# Patient Record
Sex: FEMALE | Race: WHITE | HISPANIC OR LATINO | Employment: UNEMPLOYED | URBAN - METROPOLITAN AREA
[De-identification: names, ages, dates, MRNs, and addresses within clinical notes are randomized per-mention and may not be internally consistent; named-entity substitution may affect disease eponyms.]

---

## 2017-04-05 DIAGNOSIS — Z12.31 ENCOUNTER FOR SCREENING MAMMOGRAM FOR MALIGNANT NEOPLASM OF BREAST: ICD-10-CM

## 2017-04-06 ENCOUNTER — ALLSCRIPTS OFFICE VISIT (OUTPATIENT)
Dept: OTHER | Facility: OTHER | Age: 42
End: 2017-04-06

## 2018-01-14 VITALS
DIASTOLIC BLOOD PRESSURE: 72 MMHG | BODY MASS INDEX: 33.15 KG/M2 | SYSTOLIC BLOOD PRESSURE: 104 MMHG | WEIGHT: 180.13 LBS | HEIGHT: 62 IN

## 2018-03-21 ENCOUNTER — HOSPITAL ENCOUNTER (OUTPATIENT)
Facility: HOSPITAL | Age: 43
Setting detail: OBSERVATION
Discharge: HOME | End: 2018-03-22
Attending: EMERGENCY MEDICINE | Admitting: INTERNAL MEDICINE
Payer: COMMERCIAL

## 2018-03-21 ENCOUNTER — APPOINTMENT (EMERGENCY)
Dept: RADIOLOGY | Facility: HOSPITAL | Age: 43
End: 2018-03-21
Payer: COMMERCIAL

## 2018-03-21 DIAGNOSIS — E87.6 HYPOKALEMIA: ICD-10-CM

## 2018-03-21 DIAGNOSIS — R07.9 CHEST PAIN: Primary | ICD-10-CM

## 2018-03-21 PROBLEM — M06.9 RHEUMATOID ARTHRITIS (HCC): Chronic | Status: ACTIVE | Noted: 2018-03-21

## 2018-03-21 PROBLEM — Z86.79 HISTORY OF HYPERTENSION: Chronic | Status: ACTIVE | Noted: 2018-03-21

## 2018-03-21 PROBLEM — Z86.69 HISTORY OF MIGRAINE: Chronic | Status: ACTIVE | Noted: 2018-03-21

## 2018-03-21 PROBLEM — Z86.39 HISTORY OF THYROID DISEASE: Chronic | Status: ACTIVE | Noted: 2018-03-21

## 2018-03-21 PROBLEM — Z87.39 HISTORY OF RHEUMATOID ARTHRITIS: Chronic | Status: ACTIVE | Noted: 2018-03-21

## 2018-03-21 PROBLEM — Z87.09 HISTORY OF ASTHMA: Chronic | Status: ACTIVE | Noted: 2018-03-21

## 2018-03-21 LAB
ALBUMIN SERPL BCP-MCNC: 3.5 G/DL (ref 3.5–5)
ALP SERPL-CCNC: 89 U/L (ref 46–116)
ALT SERPL W P-5'-P-CCNC: 18 U/L (ref 12–78)
ANION GAP SERPL CALCULATED.3IONS-SCNC: 7 MMOL/L (ref 4–13)
AST SERPL W P-5'-P-CCNC: 27 U/L (ref 5–45)
ATRIAL RATE: 100 BPM
BASOPHILS # BLD AUTO: 0.03 THOUSANDS/ΜL (ref 0–0.1)
BASOPHILS NFR BLD AUTO: 0 % (ref 0–1)
BILIRUB SERPL-MCNC: 0.64 MG/DL (ref 0.2–1)
BUN SERPL-MCNC: 10 MG/DL (ref 5–25)
CALCIUM SERPL-MCNC: 8.6 MG/DL (ref 8.3–10.1)
CHLORIDE SERPL-SCNC: 104 MMOL/L (ref 100–108)
CO2 SERPL-SCNC: 28 MMOL/L (ref 21–32)
CREAT SERPL-MCNC: 0.79 MG/DL (ref 0.6–1.3)
EOSINOPHIL # BLD AUTO: 0.13 THOUSAND/ΜL (ref 0–0.61)
EOSINOPHIL NFR BLD AUTO: 2 % (ref 0–6)
ERYTHROCYTE [DISTWIDTH] IN BLOOD BY AUTOMATED COUNT: 13.9 % (ref 11.6–15.1)
GFR SERPL CREATININE-BSD FRML MDRD: 92 ML/MIN/1.73SQ M
GLUCOSE SERPL-MCNC: 78 MG/DL (ref 65–140)
HCT VFR BLD AUTO: 37.2 % (ref 34.8–46.1)
HGB BLD-MCNC: 12.8 G/DL (ref 11.5–15.4)
LYMPHOCYTES # BLD AUTO: 2.66 THOUSANDS/ΜL (ref 0.6–4.47)
LYMPHOCYTES NFR BLD AUTO: 31 % (ref 14–44)
MCH RBC QN AUTO: 29.1 PG (ref 26.8–34.3)
MCHC RBC AUTO-ENTMCNC: 34.4 G/DL (ref 31.4–37.4)
MCV RBC AUTO: 85 FL (ref 82–98)
MONOCYTES # BLD AUTO: 0.49 THOUSAND/ΜL (ref 0.17–1.22)
MONOCYTES NFR BLD AUTO: 6 % (ref 4–12)
NEUTROPHILS # BLD AUTO: 5.27 THOUSANDS/ΜL (ref 1.85–7.62)
NEUTS SEG NFR BLD AUTO: 61 % (ref 43–75)
NRBC BLD AUTO-RTO: 0 /100 WBCS
P AXIS: 62 DEGREES
PLATELET # BLD AUTO: 290 THOUSANDS/UL (ref 149–390)
PLATELET # BLD AUTO: 296 THOUSANDS/UL (ref 149–390)
PMV BLD AUTO: 9.5 FL (ref 8.9–12.7)
PMV BLD AUTO: 9.7 FL (ref 8.9–12.7)
POTASSIUM SERPL-SCNC: 3.4 MMOL/L (ref 3.5–5.3)
PR INTERVAL: 144 MS
PROT SERPL-MCNC: 7.6 G/DL (ref 6.4–8.2)
QRS AXIS: 36 DEGREES
QRSD INTERVAL: 78 MS
QT INTERVAL: 320 MS
QTC INTERVAL: 412 MS
RBC # BLD AUTO: 4.4 MILLION/UL (ref 3.81–5.12)
SODIUM SERPL-SCNC: 139 MMOL/L (ref 136–145)
T WAVE AXIS: 23 DEGREES
TROPONIN I SERPL-MCNC: <0.02 NG/ML
TSH SERPL DL<=0.05 MIU/L-ACNC: 0.66 UIU/ML (ref 0.36–3.74)
VENTRICULAR RATE: 100 BPM
WBC # BLD AUTO: 8.59 THOUSAND/UL (ref 4.31–10.16)

## 2018-03-21 PROCEDURE — 84484 ASSAY OF TROPONIN QUANT: CPT | Performed by: EMERGENCY MEDICINE

## 2018-03-21 PROCEDURE — 85025 COMPLETE CBC W/AUTO DIFF WBC: CPT | Performed by: EMERGENCY MEDICINE

## 2018-03-21 PROCEDURE — 84484 ASSAY OF TROPONIN QUANT: CPT | Performed by: INTERNAL MEDICINE

## 2018-03-21 PROCEDURE — 85049 AUTOMATED PLATELET COUNT: CPT | Performed by: INTERNAL MEDICINE

## 2018-03-21 PROCEDURE — 71046 X-RAY EXAM CHEST 2 VIEWS: CPT

## 2018-03-21 PROCEDURE — 36415 COLL VENOUS BLD VENIPUNCTURE: CPT | Performed by: EMERGENCY MEDICINE

## 2018-03-21 PROCEDURE — 93005 ELECTROCARDIOGRAM TRACING: CPT

## 2018-03-21 PROCEDURE — 80053 COMPREHEN METABOLIC PANEL: CPT | Performed by: EMERGENCY MEDICINE

## 2018-03-21 PROCEDURE — 93010 ELECTROCARDIOGRAM REPORT: CPT | Performed by: INTERNAL MEDICINE

## 2018-03-21 PROCEDURE — 99285 EMERGENCY DEPT VISIT HI MDM: CPT

## 2018-03-21 PROCEDURE — 99220 PR INITIAL OBSERVATION CARE/DAY 70 MINUTES: CPT | Performed by: INTERNAL MEDICINE

## 2018-03-21 PROCEDURE — 84443 ASSAY THYROID STIM HORMONE: CPT | Performed by: INTERNAL MEDICINE

## 2018-03-21 RX ORDER — NITROGLYCERIN 0.4 MG/1
0.4 TABLET SUBLINGUAL
Status: DISCONTINUED | OUTPATIENT
Start: 2018-03-21 | End: 2018-03-22 | Stop reason: HOSPADM

## 2018-03-21 RX ORDER — MAGNESIUM HYDROXIDE/ALUMINUM HYDROXICE/SIMETHICONE 120; 1200; 1200 MG/30ML; MG/30ML; MG/30ML
30 SUSPENSION ORAL EVERY 6 HOURS PRN
Status: DISCONTINUED | OUTPATIENT
Start: 2018-03-21 | End: 2018-03-22 | Stop reason: HOSPADM

## 2018-03-21 RX ORDER — BUDESONIDE AND FORMOTEROL FUMARATE DIHYDRATE 160; 4.5 UG/1; UG/1
2 AEROSOL RESPIRATORY (INHALATION) DAILY
Status: DISCONTINUED | OUTPATIENT
Start: 2018-03-22 | End: 2018-03-22 | Stop reason: HOSPADM

## 2018-03-21 RX ORDER — ACETAMINOPHEN 325 MG/1
650 TABLET ORAL EVERY 6 HOURS PRN
Status: DISCONTINUED | OUTPATIENT
Start: 2018-03-21 | End: 2018-03-22 | Stop reason: HOSPADM

## 2018-03-21 RX ORDER — FOLIC ACID 1 MG/1
1 TABLET ORAL DAILY
Status: DISCONTINUED | OUTPATIENT
Start: 2018-03-22 | End: 2018-03-22 | Stop reason: HOSPADM

## 2018-03-21 RX ORDER — MONTELUKAST SODIUM 10 MG/1
10 TABLET ORAL DAILY
Status: DISCONTINUED | OUTPATIENT
Start: 2018-03-22 | End: 2018-03-22 | Stop reason: HOSPADM

## 2018-03-21 RX ORDER — DICYCLOMINE HYDROCHLORIDE 10 MG/1
10 CAPSULE ORAL 4 TIMES DAILY
Status: DISCONTINUED | OUTPATIENT
Start: 2018-03-21 | End: 2018-03-22 | Stop reason: HOSPADM

## 2018-03-21 RX ORDER — FLUTICASONE PROPIONATE 50 MCG
2 SPRAY, SUSPENSION (ML) NASAL DAILY
Status: DISCONTINUED | OUTPATIENT
Start: 2018-03-22 | End: 2018-03-22 | Stop reason: HOSPADM

## 2018-03-21 RX ORDER — ASPIRIN 81 MG/1
162 TABLET, CHEWABLE ORAL DAILY
Status: DISCONTINUED | OUTPATIENT
Start: 2018-03-22 | End: 2018-03-22 | Stop reason: HOSPADM

## 2018-03-21 RX ORDER — MORPHINE SULFATE 2 MG/ML
1 INJECTION, SOLUTION INTRAMUSCULAR; INTRAVENOUS EVERY 4 HOURS PRN
Status: DISCONTINUED | OUTPATIENT
Start: 2018-03-21 | End: 2018-03-22 | Stop reason: HOSPADM

## 2018-03-21 RX ORDER — PANTOPRAZOLE SODIUM 40 MG/1
40 TABLET, DELAYED RELEASE ORAL
Status: DISCONTINUED | OUTPATIENT
Start: 2018-03-22 | End: 2018-03-22 | Stop reason: HOSPADM

## 2018-03-21 RX ORDER — BUDESONIDE AND FORMOTEROL FUMARATE DIHYDRATE 160; 4.5 UG/1; UG/1
2 AEROSOL RESPIRATORY (INHALATION) DAILY
COMMUNITY
Start: 2015-10-16 | End: 2021-04-22

## 2018-03-21 RX ORDER — FOLIC ACID 1 MG/1
1 TABLET ORAL DAILY
COMMUNITY

## 2018-03-21 RX ORDER — ALBUTEROL SULFATE 90 UG/1
2 AEROSOL, METERED RESPIRATORY (INHALATION) DAILY
COMMUNITY

## 2018-03-21 RX ORDER — HYDROXYCHLOROQUINE SULFATE 200 MG/1
200 TABLET, FILM COATED ORAL DAILY
Status: DISCONTINUED | OUTPATIENT
Start: 2018-03-22 | End: 2018-03-22 | Stop reason: HOSPADM

## 2018-03-21 RX ORDER — DICYCLOMINE HYDROCHLORIDE 10 MG/1
10 CAPSULE ORAL 4 TIMES DAILY
COMMUNITY
Start: 2017-04-28 | End: 2021-04-22

## 2018-03-21 RX ORDER — ELETRIPTAN HYDROBROMIDE 20 MG/1
20 TABLET, FILM COATED ORAL EVERY 2 HOUR PRN
COMMUNITY
Start: 2018-03-20 | End: 2021-04-22

## 2018-03-21 RX ORDER — FLUTICASONE PROPIONATE 50 MCG
2 SPRAY, SUSPENSION (ML) NASAL DAILY
COMMUNITY
Start: 2016-02-25 | End: 2021-04-22

## 2018-03-21 RX ORDER — FAMOTIDINE 20 MG/1
40 TABLET, FILM COATED ORAL DAILY
Status: DISCONTINUED | OUTPATIENT
Start: 2018-03-22 | End: 2018-03-22 | Stop reason: HOSPADM

## 2018-03-21 RX ORDER — HYDROXYCHLOROQUINE SULFATE 200 MG/1
200 TABLET, FILM COATED ORAL DAILY
COMMUNITY

## 2018-03-21 RX ORDER — POTASSIUM CHLORIDE 20 MEQ/1
40 TABLET, EXTENDED RELEASE ORAL ONCE
Status: COMPLETED | OUTPATIENT
Start: 2018-03-21 | End: 2018-03-21

## 2018-03-21 RX ORDER — ONDANSETRON 2 MG/ML
4 INJECTION INTRAMUSCULAR; INTRAVENOUS EVERY 6 HOURS PRN
Status: DISCONTINUED | OUTPATIENT
Start: 2018-03-21 | End: 2018-03-22 | Stop reason: HOSPADM

## 2018-03-21 RX ORDER — FAMOTIDINE 40 MG/1
40 TABLET, FILM COATED ORAL DAILY
COMMUNITY
Start: 2015-10-16

## 2018-03-21 RX ORDER — KETOROLAC TROMETHAMINE 30 MG/ML
15 INJECTION, SOLUTION INTRAMUSCULAR; INTRAVENOUS ONCE
Status: COMPLETED | OUTPATIENT
Start: 2018-03-21 | End: 2018-03-21

## 2018-03-21 RX ORDER — MONTELUKAST SODIUM 10 MG/1
10 TABLET ORAL DAILY
COMMUNITY
Start: 2015-12-21

## 2018-03-21 RX ORDER — PANTOPRAZOLE SODIUM 40 MG/1
40 TABLET, DELAYED RELEASE ORAL DAILY
COMMUNITY
Start: 2016-01-11 | End: 2021-04-22

## 2018-03-21 RX ORDER — ALBUTEROL SULFATE 90 UG/1
2 AEROSOL, METERED RESPIRATORY (INHALATION) DAILY
Status: DISCONTINUED | OUTPATIENT
Start: 2018-03-22 | End: 2018-03-22 | Stop reason: HOSPADM

## 2018-03-21 RX ORDER — FLUTICASONE FUROATE AND VILANTEROL 100; 25 UG/1; UG/1
1 POWDER RESPIRATORY (INHALATION) DAILY
COMMUNITY
Start: 2017-08-23 | End: 2018-08-23

## 2018-03-21 RX ORDER — PROPRANOLOL HYDROCHLORIDE 40 MG/1
40 TABLET ORAL DAILY
COMMUNITY
Start: 2016-01-11 | End: 2018-03-22 | Stop reason: HOSPADM

## 2018-03-21 RX ADMIN — KETOROLAC TROMETHAMINE 15 MG: 30 INJECTION, SOLUTION INTRAMUSCULAR at 16:02

## 2018-03-21 RX ADMIN — POTASSIUM CHLORIDE 40 MEQ: 1500 TABLET, EXTENDED RELEASE ORAL at 16:01

## 2018-03-21 RX ADMIN — DICYCLOMINE HYDROCHLORIDE 10 MG: 10 CAPSULE ORAL at 21:12

## 2018-03-21 RX ADMIN — DICYCLOMINE HYDROCHLORIDE 10 MG: 10 CAPSULE ORAL at 17:35

## 2018-03-21 NOTE — ASSESSMENT & PLAN NOTE
· No exacerbation at this point  · Continue home medications    · Outpatient follow-up with her rheumatologist

## 2018-03-21 NOTE — ASSESSMENT & PLAN NOTE
· Patient has atypical chest pain  This happened after an argument with her son  Pain lasted approximately 30 minutes  Patient's pain has now subsided  Possibly due to anxiety  Rule out acute coronary syndrome  · Continue with troponins  · Patient was given aspirin by EMS  Continue aspirin for now  · Nitroglycerin p r n     · Oxygen p r n  · Pain control p r n  · DEXTER score is 0   · Possible outpatient stress test   According to the notes that Darrell Alasecca, patient had a stress test in 2014 which was a stress echocardiogram which was negative  Patient also had an echocardiogram at that time cording to the notes with an ejection fraction of 60% and a mild MR and normal LA (the official reading at that time was that of a mild to moderate MR, however, according to the notes, the cardiologist who reviewed the echocardiogram, it is more of mild MR)  · According to the patient, she used to have a cardiologist at [facility]   Thus if workup is negative, may need to follow up with that cardiologist in the outpatient

## 2018-03-21 NOTE — ASSESSMENT & PLAN NOTE
· According to the patient, she previously had a history of hypertension and was previously on blood pressure medication  When asked, patient told me that she was on propanolol before  According to her, this was discontinued and she claims that the reason why he was that her blood pressures with down really low  However, from my review of patient's notes at [facility] at Care everywhere, the propanolol was discontinued due to her history of asthma  · There is some mild elevation patient's blood pressures here  But this may be anxiety driven as patient just had an argument with her son earlier today  If blood pressures are persistently and significantly high, we may need to prescribe this patient and maintenance blood pressure medication

## 2018-03-21 NOTE — ED ATTENDING ATTESTATION
Sonya Cerrato MD, saw and evaluated the patient  I have discussed the patient with the resident/non-physician practitioner and agree with the resident's/non-physician practitioner's findings, Plan of Care, and MDM as documented in the resident's/non-physician practitioner's note, except where noted  All available labs and Radiology studies were reviewed  At this point I agree with the current assessment done in the Emergency Department  I have conducted an independent evaluation of this patient a history and physical is as follows:      Critical Care Time  CritCare Time  OA: 38 y/o f with h/o HTN and mitral regurgitation who presents with an episode of CP that began just prior to arrival  Pt states that she had a verbal argument with her teenage son when she developed the pain  Pain is sharp, L sided, initially radiated to her L arm, now resolved  No new SOB  No other n/v/dizziness/abd or back pain  No focal numbness/weakness/tingling  Notes she has had similar pain previously but not for a few years  Also with tachycardia at baseline per her report  Had an ECHO in 2015 with normal EF and evidence of trace mitral and tricuspid regurgitation  She was told that she did not need any medicatino for this  Also not on any current medication for HTN x 2 years secondary to hypotensive episodesPE, well developed f in NAD, VSS, NC/AT, MMM, neck supple/fROM/-JVD, RR/-murmurs, lungs CTAB, -w/r, abd soft, NT/ND, +Bs, -r/g, - LE edema, - calf ttp, + 2 distal pulses and capillary refill < 2 sec, AAOx3  A/p CP, occurred in setting of arguement, improved at this time   Cardiac eval with labs including delta troponin, EKG, CXR, re-eval    Procedures

## 2018-03-21 NOTE — H&P
H&P- Tracie Carlson 1975, 37 y o  female MRN: [id number]    Unit/Bed#: ED 10 Encounter: [id number]    Primary Care Provider: LAYNE Lerma   Date and time admitted to hospital: 3/21/2018 12:46 PM        * Chest pain   Assessment & Plan    · Patient has atypical chest pain  This happened after an argument with her son  Pain lasted approximately 30 minutes  Patient's pain has now subsided  Possibly due to anxiety  Rule out acute coronary syndrome  · Continue with troponins  · Patient was given aspirin by EMS  Continue aspirin for now  · Nitroglycerin p r n     · Oxygen p r n  · Pain control p r n  · DEXTER score is 0   · Possible outpatient stress test   According to the notes that Darrell Levine, patient had a stress test in 2014 which was a stress echocardiogram which was negative  Patient also had an echocardiogram at that time cording to the notes with an ejection fraction of 60% and a mild MR and normal LA (the official reading at that time was that of a mild to moderate MR, however, according to the notes, the cardiologist who reviewed the echocardiogram, it is more of mild MR)  · According to the patient, she used to have a cardiologist at [facility]   Thus if workup is negative, may need to follow up with that cardiologist in the outpatient  Hypokalemia   Assessment & Plan    · Potassium was replaced in the emergency room  · Recheck electrolytes  History of thyroid disease   Assessment & Plan    · According to the patient, she had a history of thyroid swelling in the past, but eventually, she claims that this has been inactive  From my review of patient's records at Care everywhere, patient has history of hyperthyroidism in the past   She also told me that she has history of rapid heart rate even before (chronic tachycardia)    Thus we will check TSH as patient has a mildly elevated heart rate at this point at 102 per minute  I will sign this out to the night person today that if the TSH is abnormal, we should proceed with ordering for free T4  History of hypertension   Assessment & Plan    · According to the patient, she previously had a history of hypertension and was previously on blood pressure medication  When asked, patient told me that she was on propanolol before  According to her, this was discontinued and she claims that the reason why he was that her blood pressures with down really low  However, from my review of patient's notes at [facility] at [facility] everywhere, the propanolol was discontinued due to her history of asthma  · There is some mild elevation patient's blood pressures here  But this may be anxiety driven as patient just had an argument with her son earlier today  If blood pressures are persistently and significantly high, we may need to prescribe this patient and maintenance blood pressure medication  History of asthma   Assessment & Plan    · No exacerbation  History of rheumatoid arthritis   Assessment & Plan    · No exacerbation at this point  · Continue home medications  · Outpatient follow-up with her rheumatologist        History of migraine   Assessment & Plan    · But no episodes at this point  · Continue home medications  · Outpatient follow-up with own neurologist               VTE Prophylaxis: Enoxaparin (Lovenox)  / sequential compression device   Code Status:  Full code  POLST: POLST form is not discussed and not completed at this time  Discussion with family:  I spoke to patient's  at bedside  I discussed our findings and plans  I answered questions and concerns  Anticipated Length of Stay:  Patient will be admitted on an Observation basis with an anticipated length of stay of  less than 2 midnights  Justification for Hospital Stay:  Due to the above findings and plans      Total Time for Visit, including Counseling / Coordination of Care: 1 hour  Greater than 50% of this total time spent on direct patient counseling and coordination of care  Chief Complaint:   Chest pain    History of Present Illness:    Army Jimenez is a 37 y o  female who presents with chest pain to the emergency room at [facility]  Earlier today, patient admitted that she had an argument with her son  Due to this, patient had chest pains  According to her, the pain lasted approximately 30 minutes and was gone, by the time the EMS brought her here  She described the pain to be sharp in character and it was located on her left anterior upper chest area  This was associated with some numbness/tingling sensation on her left upper extremity  When asked about shortness of breath, patient could not tell me if she did have or did not have shortness of breath earlier today  Presently, patient denies any chest pains or any shortness of breath at this point  Patient denies any palpitations  Patient denies being clammy  Patient denies any lightheadedness or dizziness  Presently, patient told me she is doing fine now and does not have any complaints  According to the patient, EMS gave her aspirin while in route here  When asked, patient told me that she has history of hypertension in the past and was previously on blood pressure medication with propanolol but this was discontinued approximately 2 years ago due to low blood pressures  Patient also told me that her mother was diagnosed with a heart attack in her 45s  Patient also told me that 1 to 2 years ago, she had a stress test and she was told that it was normal except for a mild leakage in 1 of for valves and that her heart rate runs high  According to her, she had seen a cardiologist before at [facility]       Review of Systems:    Review of Systems     Ten point review of systems done and they were negative except for the ones I mentioned in my HPI    Patient denies any headaches, dizziness, loss of consciousness  Patient denies any fever, chills, cough  Patient denies any more shortness of breath or chest pains at this point  Patient denies any nausea or vomiting or any abdominal pains  Patient denies any urinary problems or bowel movement problems  Past Medical and Surgical History:     Past Medical History:   Diagnosis Date    Asthma     Hypertension     Migraine     Rheumatoid arthritis ([address] )        Past Surgical History:   Procedure Laterality Date    CARDIAC SURGERY         Meds/Allergies:    Prior to Admission medications    Medication Sig Start Date End Date Taking?  Authorizing Provider   budesonide-formoterol [facility]) 160-4 5 mcg/act inhaler Inhale 2 sprays daily 10/16/15  Yes Historical Provider, MD   dicyclomine (BENTYL) 10 mg capsule Take 10 mg by mouth 4 (four) times a day 4/28/17  Yes Historical Provider, MD   eletriptan (RELPAX) 20 MG tablet Take 20 mg by mouth every 2 (two) hours as needed 3/20/18 3/20/19 Yes Historical Provider, MD   etanercept (ENBREL SURECLICK) 50 MG/ML injection Inject 50 mg under the skin daily 3/21/16  Yes Historical Provider, MD   famotidine (PEPCID) 40 MG tablet Take 40 mg by mouth daily 10/16/15  Yes Historical Provider, MD   fluticasone (FLONASE) 50 mcg/act nasal spray 2 sprays into each nostril daily 2/25/16  Yes Historical Provider, MD   fluticasone furoate-vilanterol (BREO ELLIPTA) Inhale 1 puff daily 8/23/17 8/23/18 Yes Historical Provider, MD   folic acid (FOLVITE) 1 mg tablet Take 1 mg by mouth daily   Yes Historical Provider, MD   hydroxychloroquine (PLAQUENIL) 200 mg tablet Take 200 mg by mouth daily   Yes Historical Provider, MD   methotrexate 2 5 mg tablet Take 1 tablet by mouth daily   Yes Historical Provider, MD   montelukast (SINGULAIR) 10 mg tablet Take 10 mg by mouth daily 12/21/15  Yes Historical Provider, MD   pantoprazole (PROTONIX) 40 mg tablet Take 40 mg by mouth daily 1/11/16  Yes Historical Provider, MD   propranolol (INDERAL) 40 mg tablet Take 40 mg by mouth daily 1/11/16  Yes Historical Provider, MD   albuterol (VENTOLIN HFA) 90 mcg/act inhaler Inhale 2 puffs daily    Historical Provider, MD   etanercept (ENBREL) 50 mg/mL SOSY Inject under the skin daily      Historical Provider, MD     Medication list was reviewed  Allergies: No Known Allergies    Social History:     Marital Status:      History   Alcohol Use No     History   Smoking Status    Never Smoker   Smokeless Tobacco    Never Used     History   Drug Use No       Family History:    Significant for premature coronary artery disease/MI:  Mother in her 45s  Physical Exam:     Vitals:   Blood Pressure: 142/89 (03/21/18 1605)  Pulse: 104 (03/21/18 1605)  Temperature: 98 °F (36 7 °C) (03/21/18 1247)  Temp Source: Oral (03/21/18 1247)  Respirations: 18 (03/21/18 1605)  Height: 5' 2" (157 5 cm) (03/21/18 1247)  Weight - Scale: 78 5 kg (173 lb) (03/21/18 1247)  SpO2: 99 % (03/21/18 1605)    Physical Exam   Constitutional: She is oriented to person, place, and time  She appears well-developed and well-nourished  No distress  HENT:   Head: Normocephalic and atraumatic  Mouth/Throat: Oropharynx is clear and moist  No oropharyngeal exudate  Eyes: Conjunctivae and EOM are normal  Pupils are equal, round, and reactive to light  Right eye exhibits no discharge  Left eye exhibits no discharge  No scleral icterus  Neck: No JVD present  No tracheal deviation present  No thyromegaly present  Cardiovascular: Regular rhythm and normal heart sounds  Exam reveals no gallop and no friction rub  No murmur heard  Mildly tachycardic  Pulmonary/Chest: Effort normal and breath sounds normal  No stridor  No respiratory distress  She has no wheezes  She has no rales  She exhibits no tenderness  Abdominal: Soft  Bowel sounds are normal  She exhibits no distension  There is no tenderness  There is no rebound and no guarding     Musculoskeletal: She exhibits no edema, tenderness or deformity  Neurological: She is alert and oriented to person, place, and time  No cranial nerve deficit  Skin: Skin is warm  No rash noted  She is not diaphoretic  No erythema  No pallor  Psychiatric: Her behavior is normal  Thought content normal    Patient is anxious  Vitals reviewed  Additional Data:     Lab Results: I have personally reviewed pertinent reports  Results from last 7 days  Lab Units 03/21/18  1319   WBC Thousand/uL 8 59   HEMOGLOBIN g/dL 12 8   HEMATOCRIT % 37 2   PLATELETS Thousands/uL 290   NEUTROS PCT % 61   LYMPHS PCT % 31   MONOS PCT % 6   EOS PCT % 2       Results from last 7 days  Lab Units 03/21/18  1319   SODIUM mmol/L 139   POTASSIUM mmol/L 3 4*   CHLORIDE mmol/L 104   CO2 mmol/L 28   BUN mg/dL 10   CREATININE mg/dL 0 79   CALCIUM mg/dL 8 6   TOTAL PROTEIN g/dL 7 6   BILIRUBIN TOTAL mg/dL 0 64   ALK PHOS U/L 89   ALT U/L 18   AST U/L 27   GLUCOSE RANDOM mg/dL 78           Imaging: I have personally reviewed pertinent reports  XR chest 2 views   Final Result by LETTY Lemos MD (03/21 7649)      No acute cardiopulmonary disease  Workstation performed: [id number]             EKG, Pathology, and Other Studies Reviewed on Admission:   · EKG:  Normal sinus rhythm at 100 per minute  Allscripts / Epic Records Reviewed: Yes reviewed patient's records at Care everywhere  ** Please Note: This note has been constructed using a voice recognition system   **

## 2018-03-21 NOTE — ASSESSMENT & PLAN NOTE
· But no episodes at this point  · Continue home medications    · Outpatient follow-up with own neurologist

## 2018-03-21 NOTE — ED NOTES
Pt denies chest pain at this time  Pt transported to floor by EDT        Ace Blunt RN  03/21/18 1640

## 2018-03-21 NOTE — ASSESSMENT & PLAN NOTE
· According to the patient, she had a history of thyroid swelling in the past, but eventually, she claims that this has been inactive  From my review of patient's records at Care everywhere, patient has history of hyperthyroidism in the past   She also told me that she has history of rapid heart rate even before (chronic tachycardia)  Thus we will check TSH as patient has a mildly elevated heart rate at this point at 102 per minute  I will sign this out to the night person today that if the TSH is abnormal, we should proceed with ordering for free T4

## 2018-03-21 NOTE — ED CARE HANDOFF
Emergency Department Sign Out Note        Sign out and transfer of care from Dr Tanya Sneed  See Separate Emergency Department note  The patient, Tia Cedillo, was evaluated by the previous provider for Chest Pain  Workup Completed:  MSE, EKG (No acute ischemia, on my review)    ED Course / Workup Pending (followup):  Pending Labs, CXR  Reevaluation  HEART Risk Score    Flowsheet Row Most Recent Value   History  0 Filed at: 03/21/2018 1340   ECG  0 Filed at: 03/21/2018 1340   Age  1 Filed at: 03/21/2018 1340   Risk Factors  1 Filed at: 03/21/2018 1340   Troponin  0 Filed at: 03/21/2018 1340   Heart Score Risk Calculator   History  0 Filed at: 03/21/2018 1340   ECG  0 Filed at: 03/21/2018 1340   Age  1 Filed at: 03/21/2018 1340   Risk Factors  1 Filed at: 03/21/2018 1340   Troponin  0 Filed at: 03/21/2018 1340   HEART Score  2 Filed at: 03/21/2018 1340   HEART Score  2 Filed at: 03/21/2018 1340        Procedures  MDM  CritCare Time    Disposition  Final diagnoses:   Hypokalemia   Chest pain     Time reflects when diagnosis was documented in both MDM as applicable and the Disposition within this note     Time User Action Codes Description Comment    3/21/2018  2:13 PM Judy Anderson Add [E87 6] Hypokalemia     3/21/2018  3:33 PM Judy [place] Add [R07 9] Chest pain     3/21/2018  3:33 PM Judy [place] Modify [E87 6] Hypokalemia     3/21/2018  3:33 PM Judy [place] Modify [R07 9] Chest pain       ED Disposition     ED Disposition Condition Comment    Admit  Case was discussed with [facility] and the patient's admission status was agreed to be Admission Status: observation status to the service of Dr Ted Villegas  Follow-up Information    None       Patient's Medications    No medications on file     No discharge procedures on file         ED Provider  Electronically Signed by     Sneah Lomax MD  03/21/18 6905

## 2018-03-21 NOTE — ED PROVIDER NOTES
History  Chief Complaint   Patient presents with    Chest Pain     pt c o chest pain after an arguement with her son  This is a 70-year-old female that presents today with chest pain  Patient has history of asthma, hypertension, migraine, rheumatoid arthritis  She states she was having a fight with her son an argument when she started having chest pain which she describes as sharp on the left side of her chest that went down her arm  States she called the ambulance and received 324 of aspirin and feels slightly better  States she has history of a murmur which she has been worked up by Cardiology and had an echo in 2015  Denies any cough  No fevers or chills  No back pain  No weakness numbness or tingling  No history of MIs in the past   No history of PEs  No recent travel outside the country  No lower extremity edema  70-year-old female presenting with chest pain will do a cardiac workup  Prior to Admission Medications   Prescriptions Last Dose Informant Patient Reported? Taking?    albuterol (VENTOLIN HFA) 90 mcg/act inhaler   Yes No   Sig: Inhale 2 puffs daily   budesonide-formoterol (SYMBICORT) 160-4 5 mcg/act inhaler   Yes Yes   Sig: Inhale 2 sprays daily   dicyclomine (BENTYL) 10 mg capsule   Yes Yes   Sig: Take 10 mg by mouth 4 (four) times a day   eletriptan (RELPAX) 20 MG tablet   Yes Yes   Sig: Take 20 mg by mouth every 2 (two) hours as needed   etanercept (ENBREL SURECLICK) 50 MG/ML injection   Yes Yes   Sig: Inject 50 mg under the skin daily   etanercept (ENBREL) 50 mg/mL SOSY   Yes No   Sig: Inject under the skin daily     famotidine (PEPCID) 40 MG tablet   Yes Yes   Sig: Take 40 mg by mouth daily   fluticasone (FLONASE) 50 mcg/act nasal spray   Yes Yes   Si sprays into each nostril daily   fluticasone furoate-vilanterol (BREO ELLIPTA)   Yes Yes   Sig: Inhale 1 puff daily   folic acid (FOLVITE) 1 mg tablet   Yes Yes   Sig: Take 1 mg by mouth daily   hydroxychloroquine (PLAQUENIL) 200 mg tablet   Yes Yes   Sig: Take 200 mg by mouth daily   methotrexate 2 5 mg tablet   Yes Yes   Sig: Take 1 tablet by mouth daily   montelukast (SINGULAIR) 10 mg tablet   Yes Yes   Sig: Take 10 mg by mouth daily   pantoprazole (PROTONIX) 40 mg tablet   Yes Yes   Sig: Take 40 mg by mouth daily   propranolol (INDERAL) 40 mg tablet   Yes Yes   Sig: Take 40 mg by mouth daily      Facility-Administered Medications: None       Past Medical History:   Diagnosis Date    Asthma     Hypertension     Migraine     Rheumatoid arthritis ([facility] [address] )        Past Surgical History:   Procedure Laterality Date    CARDIAC SURGERY         History reviewed  No pertinent family history  I have reviewed and agree with the history as documented  Social History   Substance Use Topics    Smoking status: Never Smoker    Smokeless tobacco: Never Used    Alcohol use No        Review of Systems   Constitutional: Negative  HENT: Negative  Eyes: Negative  Respiratory: Negative for cough and shortness of breath  Cardiovascular: Positive for chest pain and palpitations  Gastrointestinal: Negative  Endocrine: Negative  Genitourinary: Negative  Musculoskeletal: Negative  Neurological: Negative  Hematological: Negative  Psychiatric/Behavioral: Negative  All other systems reviewed and are negative        Physical Exam  ED Triage Vitals   Temperature Pulse Respirations Blood Pressure SpO2   03/21/18 1247 03/21/18 1247 03/21/18 1247 03/21/18 1247 03/21/18 1247   98 °F (36 7 °C) 98 20 152/80 98 %      Temp Source Heart Rate Source Patient Position - Orthostatic VS BP Location FiO2 (%)   03/21/18 1247 03/21/18 1247 03/21/18 1401 03/21/18 1605 --   Oral Monitor Lying Right arm       Pain Score       03/21/18 1247       3           Orthostatic Vital Signs  Vitals:    03/21/18 2329 03/22/18 0311 03/22/18 0713 03/22/18 1109   BP: 103/67 126/76 120/74 132/87   Pulse: 84 84 82 92   Patient Position - Orthostatic VS: Lying Lying Lying        Physical Exam   Constitutional: She is oriented to person, place, and time  She appears well-developed and well-nourished  No distress  HENT:   Head: Normocephalic  Nose: Nose normal    Mouth/Throat: Oropharynx is clear and moist    Eyes: Conjunctivae and EOM are normal  Pupils are equal, round, and reactive to light  Neck: Normal range of motion  Neck supple  Cardiovascular: Normal rate, regular rhythm and normal heart sounds  No murmur heard  Pulmonary/Chest: Effort normal and breath sounds normal  No respiratory distress  She has no wheezes  Abdominal: Soft  Bowel sounds are normal  She exhibits no distension  There is no tenderness  Musculoskeletal: Normal range of motion  She exhibits no edema, tenderness or deformity  Neurological: She is alert and oriented to person, place, and time  Skin: Skin is warm  Capillary refill takes less than 2 seconds  No rash noted  She is not diaphoretic  Psychiatric: She has a normal mood and affect  Vitals reviewed  ED Medications  Medications   ketorolac (TORADOL) injection 15 mg (15 mg Intravenous Given 3/21/18 1602)   potassium chloride (K-DUR,KLOR-CON) CR tablet 40 mEq (40 mEq Oral Given 3/21/18 1601)       Diagnostic Studies  Results Reviewed     Procedure Component Value Units Date/Time    Troponin I [[id number]]  (Normal) Collected:  03/21/18 1319    Lab Status:  Final result Specimen:  Blood from Arm, Left Updated:  03/21/18 1420     Troponin I <0 02 ng/mL     Narrative:         [employer] Chemistry analyzer 99% cutoff is > 0 04 ng/mL in network labs    o cTnI 99% cutoff is useful only when applied to patients in the clinical setting of myocardial ischemia  o cTnI 99% cutoff should be interpreted in the context of clinical history, ECG findings and possibly cardiac imaging to establish correct diagnosis    o cTnI 99% cutoff may be suggestive but clearly not indicative of a coronary event without the clinical setting of myocardial ischemia  Comprehensive metabolic panel [[id number]]  (Abnormal) Collected:  03/21/18 1319    Lab Status:  Final result Specimen:  Blood from Arm, Left Updated:  03/21/18 1355     Sodium 139 mmol/L      Potassium 3 4 (L) mmol/L      Chloride 104 mmol/L      CO2 28 mmol/L      Anion Gap 7 mmol/L      BUN 10 mg/dL      Creatinine 0 79 mg/dL      Glucose 78 mg/dL      Calcium 8 6 mg/dL      AST 27 U/L      ALT 18 U/L      Alkaline Phosphatase 89 U/L      Total Protein 7 6 g/dL      Albumin 3 5 g/dL      Total Bilirubin 0 64 mg/dL      eGFR 92 ml/min/1 73sq m     Narrative:         National Kidney Disease Education Program recommendations are as follows:  GFR calculation is accurate only with a steady state creatinine  Chronic Kidney disease less than 60 ml/min/1 73 sq  meters  Kidney failure less than 15 ml/min/1 73 sq  meters  CBC and differential [[id number]]  (Normal) Collected:  03/21/18 1319    Lab Status:  Final result Specimen:  Blood from Arm, Left Updated:  03/21/18 1331     WBC 8 59 Thousand/uL      RBC 4 40 Million/uL      Hemoglobin 12 8 g/dL      Hematocrit 37 2 %      MCV 85 fL      MCH 29 1 pg      MCHC 34 4 g/dL      RDW 13 9 %      MPV 9 7 fL      Platelets 487 Thousands/uL      nRBC 0 /100 WBCs      Neutrophils Relative 61 %      Lymphocytes Relative 31 %      Monocytes Relative 6 %      Eosinophils Relative 2 %      Basophils Relative 0 %      Neutrophils Absolute 5 27 Thousands/µL      Lymphocytes Absolute 2 66 Thousands/µL      Monocytes Absolute 0 49 Thousand/µL      Eosinophils Absolute 0 13 Thousand/µL      Basophils Absolute 0 03 Thousands/µL                  XR chest 2 views   Final Result by LETTY Doran MD (03/21 3443)      No acute cardiopulmonary disease              Workstation performed: [id number]               Procedures  ECG 12 Lead Documentation  Date/Time: 3/21/2018 1:15 PM  Performed by: Araseli Pearson  Authorized by: Merlin Varela Indications / Diagnosis:  Chest pain  Patient location:  ED  Previous ECG:     Previous ECG:  Compared to current    Similarity:  No change  Interpretation:     Interpretation: normal    Rate:     ECG rate:  100    ECG rate assessment: normal    Rhythm:     Rhythm: sinus rhythm    Ectopy:     Ectopy: none    QRS:     QRS axis:  Normal  Conduction:     Conduction: normal    ST segments:     ST segments:  Normal  T waves:     T waves: normal            Phone Consults  ED Phone Contact    ED Course  ED Course          HEART Risk Score    Flowsheet Row Most Recent Value   History  0 Filed at: 03/21/2018 1340   ECG  0 Filed at: 03/21/2018 1340   Age  1 Filed at: 03/21/2018 1340   Risk Factors  1 Filed at: 03/21/2018 1340   Troponin  0 Filed at: 03/21/2018 1340   Heart Score Risk Calculator   History  0 Filed at: 03/21/2018 1340   ECG  0 Filed at: 03/21/2018 1340   Age  1 Filed at: 03/21/2018 1340   Risk Factors  1 Filed at: 03/21/2018 1340   Troponin  0 Filed at: 03/21/2018 1340   HEART Score  2 Filed at: 03/21/2018 1340   HEART Score  2 Filed at: 03/21/2018 1340                    DEXTER Risk Score    Flowsheet Row Most Recent Value   Age >= 65  0 Filed at: 03/22/2018 0930   Known CAD (stenosis >= 50%)  0 Filed at: 03/22/2018 0930   Recent (<=24 hrs) Service Angina  1 Filed at: 03/22/2018 0930   ST Deviation >= 0 5 mm  0 Filed at: 03/22/2018 0930   3+ CAD Risk Factors (FHx, HTN, HLP, DM, Smoker)  0 Filed at: 03/22/2018 0930   Aspirin Use Past 7 Days  0 Filed at: 03/22/2018 0930   Elevated Cardiac Markers  0 Filed at: 03/22/2018 0930   DEXTER Risk Score (Calculated)  1 Filed at: 03/22/2018 0930              MDM  CritCare Time    Disposition  Final diagnoses:   Hypokalemia   Chest pain     Time reflects when diagnosis was documented in both MDM as applicable and the Disposition within this note     Time User Action Codes Description Comment    3/21/2018  2:13 PM Jackie Johnston Add [E87 6] Hypokalemia     3/21/2018 3:33 PM Deric Swenson Add [R07 9] Chest pain     3/21/2018  3:33 PM Deric Swenson Modify [E87 6] Hypokalemia     3/21/2018  3:33 PM Deric Swenson Modify [R07 9] Chest pain       ED Disposition     ED Disposition Condition Comment    Admit  Case was discussed with [facility] and the patient's admission status was agreed to be Admission Status: observation status to the service of Dr Briseyda Ulloa          Follow-up Information    None       Discharge Medication List as of 3/22/2018 11:46 AM      CONTINUE these medications which have NOT CHANGED    Details   budesonide-formoterol (SYMBICORT) 160-4 5 mcg/act inhaler Inhale 2 sprays daily, Starting Fri 10/16/2015, Historical Med      dicyclomine (BENTYL) 10 mg capsule Take 10 mg by mouth 4 (four) times a day, Starting Fri 4/28/2017, Historical Med      eletriptan (RELPAX) 20 MG tablet Take 20 mg by mouth every 2 (two) hours as needed, Starting Tue 3/20/2018, Until Wed 3/20/2019, Historical Med      !! etanercept (ENBREL SURECLICK) 50 MG/ML injection Inject 50 mg under the skin daily, Starting Mon 3/21/2016, Historical Med      famotidine (PEPCID) 40 MG tablet Take 40 mg by mouth daily, Starting Fri 10/16/2015, Historical Med      fluticasone (FLONASE) 50 mcg/act nasal spray 2 sprays into each nostril daily, Starting Thu 2/25/2016, Historical Med      fluticasone furoate-vilanterol (BREO ELLIPTA) Inhale 1 puff daily, Starting Wed 8/23/2017, Until Thu 8/23/2018, Historical Med      folic acid (FOLVITE) 1 mg tablet Take 1 mg by mouth daily, Historical Med      hydroxychloroquine (PLAQUENIL) 200 mg tablet Take 200 mg by mouth daily, Historical Med      methotrexate 2 5 mg tablet Take 1 tablet by mouth daily, Historical Med      montelukast (SINGULAIR) 10 mg tablet Take 10 mg by mouth daily, Starting Mon 12/21/2015, Historical Med      pantoprazole (PROTONIX) 40 mg tablet Take 40 mg by mouth daily, Starting Mon 1/11/2016, Historical Med      albuterol (VENTOLIN HFA) 90 mcg/act inhaler Inhale 2 puffs daily, Historical Med      !! etanercept (ENBREL) 50 mg/mL SOSY Inject under the skin daily  , Historical Med       !! - Potential duplicate medications found  Please discuss with provider  STOP taking these medications       propranolol (INDERAL) 40 mg tablet Comments:   Reason for Stopping:               Outpatient Discharge Orders  Discharge Diet     Activity as tolerated         ED Provider  Attending physically available and evaluated [facility]  I managed the patient along with the ED Attending      Electronically Signed by         Simon Chacon MD  03/22/18 1806

## 2018-03-21 NOTE — PLAN OF CARE
Problem: Potential for Falls  Goal: Patient will remain free of falls  INTERVENTIONS:  - Assess patient frequently for physical needs  -  Identify cognitive and physical deficits and behaviors that affect risk of falls    -  [place] fall precautions as indicated by assessment   - Educate patient/family on patient safety including physical limitations  - Instruct patient to call for assistance with activity based on assessment  - Modify environment to reduce risk of injury  - Consider OT/PT consult to assist with strengthening/mobility   Outcome: Progressing

## 2018-03-22 VITALS
WEIGHT: 173 LBS | OXYGEN SATURATION: 99 % | SYSTOLIC BLOOD PRESSURE: 132 MMHG | RESPIRATION RATE: 18 BRPM | TEMPERATURE: 99 F | BODY MASS INDEX: 31.83 KG/M2 | HEART RATE: 92 BPM | HEIGHT: 62 IN | DIASTOLIC BLOOD PRESSURE: 87 MMHG

## 2018-03-22 PROBLEM — R07.9 CHEST PAIN: Status: RESOLVED | Noted: 2018-03-21 | Resolved: 2018-03-22

## 2018-03-22 PROBLEM — Z86.69 HISTORY OF MIGRAINE: Chronic | Status: RESOLVED | Noted: 2018-03-21 | Resolved: 2018-03-22

## 2018-03-22 PROBLEM — E87.6 HYPOKALEMIA: Status: RESOLVED | Noted: 2018-03-21 | Resolved: 2018-03-22

## 2018-03-22 LAB
ANION GAP SERPL CALCULATED.3IONS-SCNC: 8 MMOL/L (ref 4–13)
BUN SERPL-MCNC: 12 MG/DL (ref 5–25)
CALCIUM SERPL-MCNC: 8.4 MG/DL (ref 8.3–10.1)
CHLORIDE SERPL-SCNC: 108 MMOL/L (ref 100–108)
CO2 SERPL-SCNC: 26 MMOL/L (ref 21–32)
CREAT SERPL-MCNC: 0.7 MG/DL (ref 0.6–1.3)
GFR SERPL CREATININE-BSD FRML MDRD: 106 ML/MIN/1.73SQ M
GLUCOSE SERPL-MCNC: 69 MG/DL (ref 65–140)
MAGNESIUM SERPL-MCNC: 1.9 MG/DL (ref 1.6–2.6)
POTASSIUM SERPL-SCNC: 3.7 MMOL/L (ref 3.5–5.3)
SODIUM SERPL-SCNC: 142 MMOL/L (ref 136–145)

## 2018-03-22 PROCEDURE — 83735 ASSAY OF MAGNESIUM: CPT | Performed by: INTERNAL MEDICINE

## 2018-03-22 PROCEDURE — 80048 BASIC METABOLIC PNL TOTAL CA: CPT | Performed by: INTERNAL MEDICINE

## 2018-03-22 PROCEDURE — 99217 PR OBSERVATION CARE DISCHARGE MANAGEMENT: CPT | Performed by: PHYSICIAN ASSISTANT

## 2018-03-22 RX ADMIN — HYDROXYCHLOROQUINE SULFATE 200 MG: 200 TABLET, FILM COATED ORAL at 08:43

## 2018-03-22 RX ADMIN — ASPIRIN 81 MG 162 MG: 81 TABLET ORAL at 08:46

## 2018-03-22 RX ADMIN — ACETAMINOPHEN 650 MG: 325 TABLET, FILM COATED ORAL at 03:13

## 2018-03-22 RX ADMIN — DICYCLOMINE HYDROCHLORIDE 10 MG: 10 CAPSULE ORAL at 08:46

## 2018-03-22 RX ADMIN — FOLIC ACID 1 MG: 1 TABLET ORAL at 08:46

## 2018-03-22 RX ADMIN — DICYCLOMINE HYDROCHLORIDE 10 MG: 10 CAPSULE ORAL at 11:36

## 2018-03-22 RX ADMIN — FAMOTIDINE 40 MG: 20 TABLET, FILM COATED ORAL at 08:46

## 2018-03-22 RX ADMIN — MONTELUKAST SODIUM 10 MG: 10 TABLET, COATED ORAL at 08:43

## 2018-03-22 RX ADMIN — PANTOPRAZOLE SODIUM 40 MG: 40 TABLET, DELAYED RELEASE ORAL at 06:15

## 2018-03-22 RX ADMIN — ACETAMINOPHEN 650 MG: 325 TABLET, FILM COATED ORAL at 10:40

## 2018-03-22 NOTE — ASSESSMENT & PLAN NOTE
· Patient has atypical chest pain  This happened after an argument with her son  Pain lasted approximately 30 minutes  Patient's pain has now subsided  Possibly due to anxiety vs musculoskeletal   · Troponins negative x3  · DEXTER score: 0   · No need for out-patient stress test unless PCP recommends this  Asked patient to follow up with her primary care doctor  According to the notes that Darrell Alasecca, patient had a stress test in 2014 which was a stress echocardiogram which was negative  Patient also had an echocardiogram at that time cording to the notes with an ejection fraction of 60% and a mild MR and normal LA (the official reading at that time was that of a mild to moderate MR, however, according to the notes, the cardiologist who reviewed the echocardiogram, it is more of mild MR)

## 2018-03-22 NOTE — ASSESSMENT & PLAN NOTE
· According to the patient, she had a history of thyroid swelling in the past, but eventually, she claims that this has been inactive  From my review of patient's records at Care everywhere, patient has history of hyperthyroidism in the past   She also told me that she has history of rapid heart rate even before (chronic tachycardia)      · TSH is normal

## 2018-03-22 NOTE — SOCIAL WORK
CM met with patient and , explained CM role with introduction,  Patient lives with  and 2 tennage children in [address] [facility] with 2STE from front and 3STE from back of house, 14 steps to second floor  Patient independent PTA, including driving and working  Patient has no prior DME,HHC or inpatient rehab experience  PCP is Ambreen TILLMAN  Patient has prescription plan and uses [facility]  [place] in [place]  Patient has no POA, denies MH or drug and alcohol treatment  Primary contact is patient's  Toddmadelin Haley [phone]  CM reviewed d/c planning process including the following: identifying help at home, patient preference for d/c planning needs, Discharge Lounge, [facility] Meds to Bed program, availability of treatment team to discuss questions or concerns patient and/or family may have regarding understanding medications and recognizing signs and symptoms once discharged  CM also encouraged patient to follow up with all recommended appointments after discharge  Patient advised of importance for patient and family to participate in managing patients medical well being  Patient for discharge today, no discharge needs at this time

## 2018-03-22 NOTE — ASSESSMENT & PLAN NOTE
· According to the patient, she previously had a history of hypertension and was previously on blood pressure medication  When asked, patient told me that she was on propanolol before  According to her, this was discontinued and she claims that the reason why he was that her blood pressures with down really low  However, from my review of patient's notes at [facility] at Care everywhere, the propanolol was discontinued due to her history of asthma  · There is some mild elevation patient's blood pressures here  But this may be anxiety driven as patient just had an argument with her son earlier today  If blood pressures are persistently and significantly high, we may need to prescribe this patient and maintenance blood pressure medication

## 2018-03-22 NOTE — CASE MANAGEMENT
Thank you,  [address] [facility] in the [facility] by Lucas Aggarwal for 2017  Network Utilization Review Department  Phone: [phone]; Fax [phone]  ATTENTION: The Network Utilization Review Department is now centralized for our 7 Facilities  Make a note that we have a new phone and fax numbers for our Department  Please call with any questions or concerns to [phone] and carefully follow the prompts so that you are directed to the right person  All voicemails are confidential  Fax any determinations, approvals, denials, and requests for initial or continue stay review clinical to [phone]  Due to HIGH CALL volume, it would be easier if you could please send faxed requests to expedite your requests and in part, help us provide discharge notifications faster     ==========================================================================    Initial Clinical Review    Admission: Date/Time/Statement:  03/21/2018 @ 1531  Orders Placed This Encounter   Procedures    Place in Observation (expected length of stay for this patient is less than two midnights)     Standing Status:   Standing     Number of Occurrences:   1     Order Specific Question:   Admitting Physician     Answer:   Sunday Fetter     Order Specific Question:   Level of Care     Answer:   Med Surg [16]         ED: Date/Time/Mode of Arrival:   ED Arrival Information     Expected Arrival Acuity Means of Arrival Escorted By Service Admission Type    - 3/21/2018 12:45 Urgent [address] [facility] General Medicine Urgent    Arrival Complaint    chest pain          Chief Complaint:   Chief Complaint   Patient presents with    Chest Pain     pt c o chest pain after an arguement with her son  History of Illness:   Anil Tejeda is a 37 y o  female who presents with chest pain to the emergency room at [facility]    Earlier today, patient admitted that she had an argument with her son  Due to this, patient had chest pains  According to her, the pain lasted approximately 30 minutes and was gone, by the time the EMS brought her here  She described the pain to be sharp in character and it was located on her left anterior upper chest area  This was associated with some numbness/tingling sensation on her left upper extremity  When asked about shortness of breath, patient could not tell me if she did have or did not have shortness of breath earlier today  ED Vital Signs:   ED Triage Vitals   Temperature Pulse Respirations Blood Pressure SpO2   18 1247 18 1247 18 1247 18 1247 18 1247   98 °F (36 7 °C) 98 20 152/80 98 %      Temp Source Heart Rate Source Patient Position - Orthostatic VS BP Location FiO2 (%)   18 1247 18 1247 18 1401 18 1605 --   Oral Monitor Lying Right arm       Pain Score       18 1247       3        Wt Readings from Last 1 Encounters:   18 78 5 kg (173 lb)       Abnormal Labs/Diagnostic Test Results:   WBC Thousand/uL 8 59   HEMOGLOBIN g/dL 12 8   HEMATOCRIT % 37 2   PLATELETS Thousands/uL 290     SODIUM mmol/L 139   POTASSIUM mmol/L 3 4*   CHLORIDE mmol/L 104   CO2 mmol/L 28   BUN mg/dL 10   CREATININE mg/dL 0 79   CALCIUM mg/dL 8 6   TOTAL PROTEIN g/dL 7 6   BILIRUBIN TOTAL mg/dL 0 64   ALK PHOS U/L 89   ALT U/L 18   AST U/L 27   GLUCOSE RANDOM mg/dL 78     Troponin I <=0 04 ng/mL <0 02      Chest X: No acute cardiopulmonary disease  EC, NSR    ED Treatment:   Medication Administration from 2018 1245 to 2018 1649       Date/Time Order Dose Route Action Action by Comments     2018 1602 ketorolac (TORADOL) injection 15 mg 15 mg Intravenous Given [employer], RN      2018 1601 potassium chloride (K-DUR,KLOR-CON) CR tablet 40 mEq 40 mEq Oral Given [employer], RN           Past Medical/Surgical History:    Active Ambulatory Problems Diagnosis Date Noted    [facility] 03/21/2018     Past Medical History:   Diagnosis Date    Asthma     Hypertension     Migraine     [facility]        Admitting Diagnosis: Hypokalemia [E87 6]  Chest pain [R07 9]    Age/Sex: 37 y o  female    Assessment/Plan:   * Chest pain   Assessment & Plan     · Patient has atypical chest pain  This happened after an argument with her son  Pain lasted approximately 30 minutes  Patient's pain has now subsided  Possibly due to anxiety  Rule out acute coronary syndrome  · Continue with troponins  · Patient was given aspirin by EMS  Continue aspirin for now  · Nitroglycerin p r n     · Oxygen p r n  · Pain control p r n  · DEXTER score is 0   · Possible outpatient stress test   According to the notes that Darrell Levine, patient had a stress test in 2014 which was a stress echocardiogram which was negative  Patient also had an echocardiogram at that time cording to the notes with an ejection fraction of 60% and a mild MR and normal LA (the official reading at that time was that of a mild to moderate MR, however, according to the notes, the cardiologist who reviewed the echocardiogram, it is more of mild MR)  · According to the patient, she used to have a cardiologist at [facility]   Thus if workup is negative, may need to follow up with that cardiologist in the outpatient             Hypokalemia   Assessment & Plan     · Potassium was replaced in the emergency room  · Recheck electrolytes           History of thyroid disease   Assessment & Plan     · According to the patient, she had a history of thyroid swelling in the past, but eventually, she claims that this has been inactive  From my review of patient's records at [facility], patient has history of hyperthyroidism in the past   She also told me that she has history of rapid heart rate even before (chronic tachycardia)    Thus we will check TSH as patient has a mildly elevated heart rate at this point at 102 per minute  I will sign this out to the night person today that if the TSH is abnormal, we should proceed with ordering for free T4           History of hypertension   Assessment & Plan     · According to the patient, she previously had a history of hypertension and was previously on blood pressure medication  When asked, patient told me that she was on propanolol before  According to her, this was discontinued and she claims that the reason why he was that her blood pressures with down really low  However, from my review of patient's notes at [facility] at [facility], the propanolol was discontinued due to her history of asthma  · There is some mild elevation patient's blood pressures here  But this may be anxiety driven as patient just had an argument with her son earlier today  If blood pressures are persistently and significantly high, we may need to prescribe this patient and maintenance blood pressure medication           History of asthma   Assessment & Plan     · No exacerbation              History of rheumatoid arthritis   Assessment & Plan     · No exacerbation at this point  · Continue home medications  · Outpatient follow-up with her rheumatologist          History of migraine   Assessment & Plan     · But no episodes at this point  · Continue home medications  · Outpatient follow-up with own neurologist               VTE Prophylaxis: Enoxaparin (Lovenox)  / sequential compression device      Anticipated Length of Stay:  Patient will be admitted on an Observation basis with an anticipated length of stay of  less than 2 midnights     Justification for Hospital Stay:  Due to the above findings and plans        Admission Orders:  Scheduled Meds:   Current Facility-Administered Medications:  acetaminophen 650 mg Oral Q6H PRN   albuterol 2 puff Inhalation Daily   aluminum-magnesium hydroxide-simethicone 30 mL Oral Q6H PRN   aspirin 162 mg Oral Daily   budesonide-formoterol 2 puff Inhalation Daily   dicyclomine 10 mg Oral 4x Daily   enoxaparin 40 mg Subcutaneous Daily   etanercept 50 mg Subcutaneous Q7 Days   famotidine 40 mg Oral Daily   fluticasone 2 spray Nasal Daily   folic acid 1 mg Oral Daily   hydroxychloroquine 200 mg Oral Daily   [START ON 3/23/2018] methotrexate 2 5 mg Oral Weekly   montelukast 10 mg Oral Daily   morphine injection 1 mg Intravenous Q4H PRN   nitroglycerin 0 4 mg Sublingual Q5 Min PRN   ondansetron 4 mg Intravenous Q6H PRN   pantoprazole 40 mg Oral Early Morning     TELM  O2 @ 2L via NC  Shree SCDs

## 2018-03-22 NOTE — DISCHARGE SUMMARY
Discharge- Coy aMthur 1975, 37 y o  female MRN: [id number]  Unit/Bed#: CW2 210-01 Encounter: [id number]  Primary Care Provider: LAYNE Etienne   Date and time admitted to hospital: 3/21/2018 12:46 PM    * Chest painresolved as of 3/22/2018   Assessment & Plan    · Patient has atypical chest pain  This happened after an argument with her son  Pain lasted approximately 30 minutes  Patient's pain has now subsided  Possibly due to anxiety vs musculoskeletal   · Troponins negative x3  · DEXTER score: 0   · No need for out-patient stress test unless PCP recommends this  Asked patient to follow up with her primary care doctor  According to the notes that Darrell Levine, patient had a stress test in 2014 which was a stress echocardiogram which was negative  Patient also had an echocardiogram at that time cording to the notes with an ejection fraction of 60% and a mild MR and normal LA (the official reading at that time was that of a mild to moderate MR, however, according to the notes, the cardiologist who reviewed the echocardiogram, it is more of mild MR)  History of thyroid disease   Assessment & Plan    · According to the patient, she had a history of thyroid swelling in the past, but eventually, she claims that this has been inactive  From my review of patient's records at [facility], patient has history of hyperthyroidism in the past   She also told me that she has history of rapid heart rate even before (chronic tachycardia)  · TSH is normal         History of hypertension   Assessment & Plan    · According to the patient, she previously had a history of hypertension and was previously on blood pressure medication  When asked, patient told me that she was on propanolol before  According to her, this was discontinued and she claims that the reason why he was that her blood pressures with down really low    However, from my review of patient's notes at [facility] at [facility], the propanolol was discontinued due to her history of asthma  · There is some mild elevation patient's blood pressures here  But this may be anxiety driven as patient just had an argument with her son earlier today  If blood pressures are persistently and significantly high, we may need to prescribe this patient and maintenance blood pressure medication  History of asthma   Assessment & Plan    · No exacerbation  History of rheumatoid arthritis   Assessment & Plan    · No exacerbation at this point  · Continue home medications  · Outpatient follow-up with her rheumatologist        History of migraine   Assessment & Plan    · But no episodes at this point  · Continue home medications  · Outpatient follow-up with own neurologist             Resolved Problems  Date Reviewed: 3/22/2018          Resolved    * (Principal)Chest pain 3/22/2018     Resolved by  Willy Angulo PA-C    Hypokalemia 3/22/2018     Resolved by  Willy Angulo PA-C        Consultations During Hospital Stay:  · None    Procedures Performed:   · Chest x-ray:  No acute cardiopulmonary disease  Significant Findings / Test Results:   · None    Incidental Findings:   · None     Test Results Pending at Discharge (will require follow up): · None     Outpatient Tests Requested:  · None    Complications:  None    Reason for Admission:  Chest pain    Hospital Course:     Jacky Hernandez is a 37 y o  female patient who originally presented to the hospital on 3/21/2018 due to chest pain which began after engaging in an argument with her son  Her symptoms resolved spontaneously within 30 minutes  She was monitored on telemetry with no evidence of arrhythmia  She had a normal EKG  Troponins were negative x3  Her symptoms resolved without recurrence during her hospital stay    Patient had a stress test done a few years ago which was normal   At this time, I strongly suspect this is musculoskeletal in nature and in fact will not schedule her for an outpatient stress test   However, she should follow up with her primary care doctor within 1 week to determine if an outpatient stress test is warranted  Please see above list of diagnoses and related plan for additional information  Condition at Discharge: stable     Discharge Day Visit / Exam:     Subjective:  Patient states her chestPain has resolved without recurrence  She denies any nausea, vomiting, shortness of breath  Vitals: Blood Pressure: 120/74 (03/22/18 0713)  Pulse: 82 (03/22/18 0713)  Temperature: 97 8 °F (36 6 °C) (03/22/18 0713)  Temp Source: Oral (03/22/18 0713)  Respirations: 18 (03/22/18 0713)  Height: 5' 2" (157 5 cm) (03/21/18 1700)  Weight - Scale: 78 5 kg (173 lb) (03/21/18 1700)  SpO2: 96 % (03/22/18 0713)  Exam:   Physical Exam   HENT:   Head: Normocephalic and atraumatic  Mouth/Throat: Oropharynx is clear and moist and mucous membranes are normal    Eyes: No scleral icterus  Cardiovascular: Normal rate and regular rhythm  No murmur heard  Pulmonary/Chest: Breath sounds normal  She has no wheezes  She has no rales  She exhibits no tenderness  Abdominal: Soft  Bowel sounds are normal  She exhibits no distension  There is no tenderness  Musculoskeletal: Normal range of motion  She exhibits no edema  Skin: Skin is warm and dry  No rash noted  Psychiatric: She has a normal mood and affect  Vitals reviewed  Discussion with Family:  Spouse is at the bedside  Discharge instructions/Information to patient and family:   See after visit summary for information provided to patient and family  Provisions for Follow-Up Care:  See after visit summary for information related to follow-up care and any pertinent home health orders        Disposition:     Home    For Discharges to [facility]:   · Not Applicable to this Patient - Not Applicable to this Patient    Planned Readmission: No     Discharge Statement:  I spent 45 minutes discharging the patient  This time was spent on the day of discharge  I had direct contact with the patient on the day of discharge  Greater than 50% of the total time was spent examining patient, answering all patient questions, arranging and discussing plan of care with patient as well as directly providing post-discharge instructions  Additional time then spent on discharge activities  Discharge Medications:  See after visit summary for reconciled discharge medications provided to patient and family        ** Please Note: This note has been constructed using a voice recognition system **

## 2018-06-05 ENCOUNTER — ANNUAL EXAM (OUTPATIENT)
Dept: GYNECOLOGY | Facility: CLINIC | Age: 43
End: 2018-06-05
Payer: COMMERCIAL

## 2018-06-05 VITALS
DIASTOLIC BLOOD PRESSURE: 78 MMHG | BODY MASS INDEX: 32.17 KG/M2 | SYSTOLIC BLOOD PRESSURE: 120 MMHG | HEIGHT: 62 IN | WEIGHT: 174.8 LBS

## 2018-06-05 DIAGNOSIS — Z01.419 ENCOUNTER FOR GYNECOLOGICAL EXAMINATION WITHOUT ABNORMAL FINDING: ICD-10-CM

## 2018-06-05 DIAGNOSIS — Z12.31 ENCOUNTER FOR SCREENING MAMMOGRAM FOR MALIGNANT NEOPLASM OF BREAST: Primary | ICD-10-CM

## 2018-06-05 PROCEDURE — 99396 PREV VISIT EST AGE 40-64: CPT | Performed by: OBSTETRICS & GYNECOLOGY

## 2018-06-05 RX ORDER — LORATADINE 10 MG/1
TABLET ORAL
COMMUNITY
Start: 2018-05-31

## 2018-06-05 RX ORDER — FAMOTIDINE 40 MG/1
TABLET, FILM COATED ORAL
COMMUNITY
Start: 2018-05-29 | End: 2021-04-22

## 2018-06-05 RX ORDER — TRAZODONE HYDROCHLORIDE 50 MG/1
50 TABLET ORAL DAILY
COMMUNITY
End: 2021-04-22

## 2018-06-05 RX ORDER — MECLIZINE HYDROCHLORIDE 25 MG/1
25 TABLET ORAL 3 TIMES DAILY
COMMUNITY
Start: 2017-01-03 | End: 2021-04-22

## 2018-06-05 RX ORDER — INDOMETHACIN 50 MG/1
CAPSULE ORAL
COMMUNITY
Start: 2018-04-20 | End: 2021-04-22

## 2018-06-05 RX ORDER — CEPHALEXIN 500 MG/1
CAPSULE ORAL
COMMUNITY
Start: 2018-03-29 | End: 2021-04-22

## 2018-06-08 ENCOUNTER — HOSPITAL ENCOUNTER (OUTPATIENT)
Dept: RADIOLOGY | Age: 43
Discharge: HOME | End: 2018-06-08
Payer: COMMERCIAL

## 2018-06-08 DIAGNOSIS — Z12.31 ENCOUNTER FOR SCREENING MAMMOGRAM FOR MALIGNANT NEOPLASM OF BREAST: ICD-10-CM

## 2018-06-08 PROCEDURE — 77063 BREAST TOMOSYNTHESIS BI: CPT

## 2018-06-08 PROCEDURE — 77067 SCR MAMMO BI INCL CAD: CPT

## 2019-06-11 ENCOUNTER — ANNUAL EXAM (OUTPATIENT)
Dept: GYNECOLOGY | Facility: CLINIC | Age: 44
End: 2019-06-11
Payer: COMMERCIAL

## 2019-06-11 VITALS
BODY MASS INDEX: 32.76 KG/M2 | WEIGHT: 178 LBS | HEART RATE: 75 BPM | DIASTOLIC BLOOD PRESSURE: 80 MMHG | SYSTOLIC BLOOD PRESSURE: 140 MMHG | HEIGHT: 62 IN

## 2019-06-11 DIAGNOSIS — Z01.419 ENCOUNTER FOR GYNECOLOGICAL EXAMINATION WITHOUT ABNORMAL FINDING: ICD-10-CM

## 2019-06-11 DIAGNOSIS — Z12.31 ENCOUNTER FOR SCREENING MAMMOGRAM FOR MALIGNANT NEOPLASM OF BREAST: Primary | ICD-10-CM

## 2019-06-11 PROCEDURE — 99396 PREV VISIT EST AGE 40-64: CPT | Performed by: OBSTETRICS & GYNECOLOGY

## 2019-10-17 ENCOUNTER — HOSPITAL ENCOUNTER (OUTPATIENT)
Dept: RADIOLOGY | Age: 44
Discharge: HOME | End: 2019-10-17
Payer: COMMERCIAL

## 2019-10-17 VITALS — WEIGHT: 160 LBS | HEIGHT: 62 IN | BODY MASS INDEX: 29.44 KG/M2

## 2019-10-17 DIAGNOSIS — Z12.31 ENCOUNTER FOR SCREENING MAMMOGRAM FOR MALIGNANT NEOPLASM OF BREAST: ICD-10-CM

## 2019-10-17 PROCEDURE — 77063 BREAST TOMOSYNTHESIS BI: CPT

## 2019-10-17 PROCEDURE — 77067 SCR MAMMO BI INCL CAD: CPT

## 2020-12-04 ENCOUNTER — TELEPHONE (OUTPATIENT)
Dept: GYNECOLOGY | Facility: CLINIC | Age: 45
End: 2020-12-04

## 2021-03-10 DIAGNOSIS — Z23 ENCOUNTER FOR IMMUNIZATION: ICD-10-CM

## 2021-03-16 ENCOUNTER — IMMUNIZATIONS (OUTPATIENT)
Dept: FAMILY MEDICINE CLINIC | Facility: HOSPITAL | Age: 46
End: 2021-03-16

## 2021-03-16 DIAGNOSIS — Z23 ENCOUNTER FOR IMMUNIZATION: Primary | ICD-10-CM

## 2021-03-16 PROCEDURE — 91300 SARS-COV-2 / COVID-19 MRNA VACCINE (PFIZER-BIONTECH) 30 MCG: CPT

## 2021-03-16 PROCEDURE — 0001A SARS-COV-2 / COVID-19 MRNA VACCINE (PFIZER-BIONTECH) 30 MCG: CPT

## 2021-04-07 ENCOUNTER — IMMUNIZATIONS (OUTPATIENT)
Dept: FAMILY MEDICINE CLINIC | Facility: HOSPITAL | Age: 46
End: 2021-04-07

## 2021-04-07 DIAGNOSIS — Z23 ENCOUNTER FOR IMMUNIZATION: Primary | ICD-10-CM

## 2021-04-07 PROCEDURE — 0002A SARS-COV-2 / COVID-19 MRNA VACCINE (PFIZER-BIONTECH) 30 MCG: CPT

## 2021-04-07 PROCEDURE — 91300 SARS-COV-2 / COVID-19 MRNA VACCINE (PFIZER-BIONTECH) 30 MCG: CPT

## 2021-04-22 ENCOUNTER — ANNUAL EXAM (OUTPATIENT)
Dept: GYNECOLOGY | Facility: CLINIC | Age: 46
End: 2021-04-22
Payer: COMMERCIAL

## 2021-04-22 VITALS
BODY MASS INDEX: 31.01 KG/M2 | DIASTOLIC BLOOD PRESSURE: 70 MMHG | SYSTOLIC BLOOD PRESSURE: 110 MMHG | HEART RATE: 96 BPM | WEIGHT: 175 LBS | HEIGHT: 63 IN

## 2021-04-22 DIAGNOSIS — Z01.419 ENCOUNTER FOR GYNECOLOGICAL EXAMINATION WITHOUT ABNORMAL FINDING: ICD-10-CM

## 2021-04-22 DIAGNOSIS — Z12.31 ENCOUNTER FOR SCREENING MAMMOGRAM FOR MALIGNANT NEOPLASM OF BREAST: Primary | ICD-10-CM

## 2021-04-22 PROCEDURE — 3008F BODY MASS INDEX DOCD: CPT | Performed by: OBSTETRICS & GYNECOLOGY

## 2021-04-22 PROCEDURE — 0503F POSTPARTUM CARE VISIT: CPT | Performed by: OBSTETRICS & GYNECOLOGY

## 2021-04-22 PROCEDURE — 99396 PREV VISIT EST AGE 40-64: CPT | Performed by: OBSTETRICS & GYNECOLOGY

## 2021-04-22 PROCEDURE — 1036F TOBACCO NON-USER: CPT | Performed by: OBSTETRICS & GYNECOLOGY

## 2021-04-22 RX ORDER — ERGOCALCIFEROL 1.25 MG/1
CAPSULE ORAL
COMMUNITY
Start: 2021-01-30

## 2021-04-22 RX ORDER — METOPROLOL SUCCINATE 25 MG/1
25 TABLET, EXTENDED RELEASE ORAL DAILY
COMMUNITY
Start: 2020-09-23 | End: 2021-09-23

## 2021-04-22 RX ORDER — LISINOPRIL 10 MG/1
10 TABLET ORAL DAILY
COMMUNITY
Start: 2021-03-01

## 2021-04-22 RX ORDER — SUMATRIPTAN 20 MG/1
1 SPRAY NASAL EVERY 2 HOUR PRN
COMMUNITY

## 2021-04-22 RX ORDER — NORTRIPTYLINE HYDROCHLORIDE 10 MG/1
CAPSULE ORAL
COMMUNITY
Start: 2021-04-16

## 2021-04-22 NOTE — PROGRESS NOTES
Assessment/Plan:         Diagnoses and all orders for this visit:    Encounter for screening mammogram for malignant neoplasm of breast  -     Mammo screening bilateral w 3d & cad; Future    Encounter for gynecological examination without abnormal finding    Other orders  -     metoprolol succinate (TOPROL-XL) 25 mg 24 hr tablet; Take 25 mg by mouth daily  -     ergocalciferol (VITAMIN D2) 50,000 units; ONE EVERY OTHER WEEK  -     lisinopril (ZESTRIL) 10 mg tablet; Take 10 mg by mouth daily  -     nortriptyline (PAMELOR) 10 mg capsule; TAKE 1 CAPSULE BY MOUTH EVERY EVENING  -     SUMAtriptan (IMITREX) 20 MG/ACT nasal spray; 1 spray into each nostril every 2 (two) hours as needed for migraine        Subjective:      Patient ID: Garrett Weller is a 55 y o  female  HPI  all patient presents for yearly examination  She offers no complaints  She is status post total vaginal hysterectomy with mid urethral sling in 2007  She denies any vaginal irritation, burning, discharge or bleeding  Denies any dysuria, hematuria urgency or urinary incontinence  No GI complaints  The following portions of the patient's history were reviewed and updated as appropriate:   She  has a past medical history of Asthma, Human papilloma virus, Hypertension, Migraine, and Rheumatoid arthritis ([facility] )  She   Patient Active Problem List    Diagnosis Date Noted    Rheumatoid [facility] ([facility] ) 03/21/2018    History of migraine 03/21/2018    History of rheumatoid arthritis 03/21/2018    History of asthma 03/21/2018    History of hypertension 03/21/2018    History of thyroid disease 03/21/2018     She  has a past surgical history that includes Cardiac surgery; Bladder suspension; Tubal ligation; Breast biopsy (Right, 08/06/2015); and Vaginal hysterectomy (2007)    Her family history includes Breast cancer in her sister; Colon cancer (age of onset: 61) in her mother; Endometrial cancer (age of onset: 28) in her mother; No Known Problems in her daughter, father, maternal aunt, maternal grandfather, maternal grandmother, paternal aunt, paternal aunt, paternal aunt, paternal aunt, paternal grandfather, paternal grandmother, sister, and sister  She  reports that she has never smoked  She has never used smokeless tobacco  She reports that she does not drink alcohol or use drugs  Current Outpatient Medications   Medication Sig Dispense Refill    metoprolol succinate (TOPROL-XL) 25 mg 24 hr tablet Take 25 mg by mouth daily      nortriptyline (PAMELOR) 10 mg capsule TAKE 1 CAPSULE BY MOUTH EVERY EVENING      SUMAtriptan (IMITREX) 20 MG/ACT nasal spray 1 spray into each nostril every 2 (two) hours as needed for migraine      albuterol (VENTOLIN HFA) 90 mcg/act inhaler Inhale 2 puffs daily      ergocalciferol (VITAMIN D2) 50,000 units ONE EVERY OTHER WEEK      etanercept (ENBREL SURECLICK) 50 MG/ML injection Inject 50 mg under the skin daily      famotidine (PEPCID) 40 MG tablet Take 40 mg by mouth daily      folic acid (FOLVITE) 1 mg tablet Take 1 mg by mouth daily      hydroxychloroquine (PLAQUENIL) 200 mg tablet Take 200 mg by mouth daily      lisinopril (ZESTRIL) 10 mg tablet Take 10 mg by mouth daily      loratadine (CLARITIN) 10 mg tablet       methotrexate 2 5 mg tablet Take 1 tablet by mouth daily      montelukast (SINGULAIR) 10 mg tablet Take 10 mg by mouth daily       No current facility-administered medications for this visit        Current Outpatient Medications on File Prior to Visit   Medication Sig    metoprolol succinate (TOPROL-XL) 25 mg 24 hr tablet Take 25 mg by mouth daily    nortriptyline (PAMELOR) 10 mg capsule TAKE 1 CAPSULE BY MOUTH EVERY EVENING    SUMAtriptan (IMITREX) 20 MG/ACT nasal spray 1 spray into each nostril every 2 (two) hours as needed for migraine    albuterol (VENTOLIN HFA) 90 mcg/act inhaler Inhale 2 puffs daily    ergocalciferol (VITAMIN D2) 50,000 units ONE EVERY OTHER WEEK    etanercept (ENBREL SURECLICK) 50 MG/ML injection Inject 50 mg under the skin daily    famotidine (PEPCID) 40 MG tablet Take 40 mg by mouth daily    folic acid (FOLVITE) 1 mg tablet Take 1 mg by mouth daily    hydroxychloroquine (PLAQUENIL) 200 mg tablet Take 200 mg by mouth daily    lisinopril (ZESTRIL) 10 mg tablet Take 10 mg by mouth daily    loratadine (CLARITIN) 10 mg tablet     methotrexate 2 5 mg tablet Take 1 tablet by mouth daily    montelukast (SINGULAIR) 10 mg tablet Take 10 mg by mouth daily    [DISCONTINUED] budesonide-formoterol (SYMBICORT) 160-4 5 mcg/act inhaler Inhale 2 sprays daily    [DISCONTINUED] cephalexin (KEFLEX) 500 mg capsule     [DISCONTINUED] dicyclomine (BENTYL) 10 mg capsule Take 10 mg by mouth 4 (four) times a day    [DISCONTINUED] eletriptan (RELPAX) 20 MG tablet Take 20 mg by mouth every 2 (two) hours as needed    [DISCONTINUED] etanercept (ENBREL SURECLICK) 50 MG/ML injection Inject 50 mg under the skin Once a week    [DISCONTINUED] etanercept (ENBREL) 50 mg/mL SOSY Inject under the skin daily      [DISCONTINUED] famotidine (PEPCID) 40 MG tablet TAKE 1 TABLET (40 MG TOTAL) BY MOUTH DAILY AS NEEDED FOR HEARTBURN     [DISCONTINUED] fluticasone (FLONASE) 50 mcg/act nasal spray 2 sprays into each nostril daily    [DISCONTINUED] indomethacin (INDOCIN) 50 mg capsule     [DISCONTINUED] meclizine (ANTIVERT) 25 mg tablet Take 25 mg by mouth Three times a day    [DISCONTINUED] methotrexate 2 5 mg tablet Take 12 5 mg by mouth    [DISCONTINUED] pantoprazole (PROTONIX) 40 mg tablet Take 40 mg by mouth daily    [DISCONTINUED] traZODone (DESYREL) 50 mg tablet Take 50 mg by mouth daily     No current facility-administered medications on file prior to visit  She has No Known Allergies       Review of Systems   Constitutional: Negative  HENT: Negative for sore throat and trouble swallowing  Gastrointestinal: Negative  Genitourinary: Negative            Objective:      /70 (BP Location: Left arm, Patient Position: Sitting, Cuff Size: Standard)   Pulse 96   Ht 5' 2 5" (1 588 m)   Wt 79 4 kg (175 lb)   BMI 31 50 kg/m²          Physical Exam  Vitals signs reviewed  Constitutional:       Appearance: Normal appearance  Neck:      Musculoskeletal: Normal range of motion and neck supple  No muscular tenderness  Cardiovascular:      Rate and Rhythm: Normal rate and regular rhythm  Pulses: Normal pulses  Heart sounds: Normal heart sounds  No murmur  Pulmonary:      Effort: Pulmonary effort is normal  No respiratory distress  Breath sounds: Normal breath sounds  Chest:      Breasts:         Right: No swelling, bleeding, inverted nipple, mass, nipple discharge, skin change or tenderness  Left: No swelling, bleeding, inverted nipple, mass, nipple discharge, skin change or tenderness  Abdominal:      General: There is no distension  Palpations: Abdomen is soft  There is no mass  Tenderness: There is no abdominal tenderness  There is no guarding or rebound  Hernia: No hernia is present  There is no hernia in the left inguinal area or right inguinal area  Genitourinary:     General: Normal vulva  Labia:         Right: No rash, tenderness or lesion  Left: No rash, tenderness or lesion  Vagina: Normal       Uterus: Absent  Adnexa:         Right: No mass, tenderness or fullness  Left: No mass, tenderness or fullness  Lymphadenopathy:      Cervical: No cervical adenopathy  Upper Body:      Right upper body: No supraclavicular, axillary or pectoral adenopathy  Left upper body: No supraclavicular, axillary or pectoral adenopathy  Lower Body: No right inguinal adenopathy  No left inguinal adenopathy  Neurological:      Mental Status: She is alert

## 2021-10-10 ENCOUNTER — HOSPITAL ENCOUNTER (EMERGENCY)
Facility: HOSPITAL | Age: 46
Discharge: HOME | End: 2021-10-10
Admitting: SURGERY
Payer: COMMERCIAL

## 2021-10-10 ENCOUNTER — APPOINTMENT (EMERGENCY)
Dept: RADIOLOGY | Facility: HOSPITAL | Age: 46
End: 2021-10-10
Payer: COMMERCIAL

## 2021-10-10 VITALS
RESPIRATION RATE: 13 BRPM | WEIGHT: 179.45 LBS | TEMPERATURE: 97.9 F | SYSTOLIC BLOOD PRESSURE: 151 MMHG | HEART RATE: 84 BPM | OXYGEN SATURATION: 100 % | DIASTOLIC BLOOD PRESSURE: 100 MMHG

## 2021-10-10 PROBLEM — S06.0X9A CONCUSSION: Status: ACTIVE | Noted: 2021-10-10
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
## 2021-10-10 LAB
BASE EXCESS BLDA CALC-SCNC: 2 MMOL/L (ref -2–3)
GLUCOSE SERPL-MCNC: 98 MG/DL (ref 65–140)
HCO3 BLDA-SCNC: 26.9 MMOL/L (ref 24–30)
HCT VFR BLD CALC: 38 % (ref 34.8–46.1)
HGB BLDA-MCNC: 12.9 G/DL (ref 11.5–15.4)
HOLD SPECIMEN: NORMAL
PCO2 BLD: 28 MMOL/L (ref 21–32)
PCO2 BLD: 40.6 MM HG (ref 42–50)
PH BLD: 7.43 [PH] (ref 7.3–7.4)
PO2 BLD: 36 MM HG (ref 35–45)
POTASSIUM BLD-SCNC: 3.2 MMOL/L (ref 3.5–5.3)
SAO2 % BLD FROM PO2: 71 % (ref 60–85)
SODIUM BLD-SCNC: 145 MMOL/L (ref 136–145)
SPECIMEN SOURCE: ABNORMAL

## 2021-10-10 PROCEDURE — 84295 ASSAY OF SERUM SODIUM: CPT

## 2021-10-10 PROCEDURE — 82803 BLOOD GASES ANY COMBINATION: CPT

## 2021-10-10 PROCEDURE — 99284 EMERGENCY DEPT VISIT MOD MDM: CPT

## 2021-10-10 PROCEDURE — NC001 PR NO CHARGE: Performed by: EMERGENCY MEDICINE

## 2021-10-10 PROCEDURE — 70450 CT HEAD/BRAIN W/O DYE: CPT

## 2021-10-10 PROCEDURE — 84132 ASSAY OF SERUM POTASSIUM: CPT

## 2021-10-10 PROCEDURE — 72125 CT NECK SPINE W/O DYE: CPT

## 2021-10-10 PROCEDURE — 99284 EMERGENCY DEPT VISIT MOD MDM: CPT | Performed by: SURGERY

## 2021-10-10 PROCEDURE — 82947 ASSAY GLUCOSE BLOOD QUANT: CPT

## 2021-10-10 PROCEDURE — 36415 COLL VENOUS BLD VENIPUNCTURE: CPT

## 2021-10-10 PROCEDURE — 85014 HEMATOCRIT: CPT

## 2021-10-26 ENCOUNTER — OFFICE VISIT (OUTPATIENT)
Dept: SURGERY | Facility: CLINIC | Age: 46
End: 2021-10-26
Payer: COMMERCIAL

## 2021-10-26 VITALS — TEMPERATURE: 97.7 F | HEIGHT: 62 IN | BODY MASS INDEX: 32.68 KG/M2 | WEIGHT: 177.6 LBS

## 2021-10-26 DIAGNOSIS — S06.0X9A CONCUSSION: Primary | ICD-10-CM

## 2021-10-26 PROCEDURE — 99212 OFFICE O/P EST SF 10 MIN: CPT | Performed by: PHYSICIAN ASSISTANT

## 2021-11-15 ENCOUNTER — TELEPHONE (OUTPATIENT)
Dept: PHYSICAL THERAPY | Facility: CLINIC | Age: 46
End: 2021-11-15